# Patient Record
Sex: MALE | URBAN - METROPOLITAN AREA
[De-identification: names, ages, dates, MRNs, and addresses within clinical notes are randomized per-mention and may not be internally consistent; named-entity substitution may affect disease eponyms.]

---

## 2021-12-16 ENCOUNTER — APPOINTMENT (RX ONLY)
Dept: URBAN - METROPOLITAN AREA CLINIC 81 | Facility: CLINIC | Age: 51
Setting detail: DERMATOLOGY
End: 2021-12-16

## 2021-12-16 DIAGNOSIS — D18.0 HEMANGIOMA: ICD-10-CM

## 2021-12-16 DIAGNOSIS — L21.8 OTHER SEBORRHEIC DERMATITIS: ICD-10-CM

## 2021-12-16 PROBLEM — D18.01 HEMANGIOMA OF SKIN AND SUBCUTANEOUS TISSUE: Status: ACTIVE | Noted: 2021-12-16

## 2021-12-16 PROCEDURE — ? COUNSELING

## 2021-12-16 PROCEDURE — ? DIAGNOSIS COMMENT

## 2021-12-16 PROCEDURE — 99203 OFFICE O/P NEW LOW 30 MIN: CPT

## 2021-12-16 PROCEDURE — ? KOH PREP

## 2021-12-16 PROCEDURE — ? PRESCRIPTION

## 2021-12-16 PROCEDURE — ? PRESCRIPTION MEDICATION MANAGEMENT

## 2021-12-16 RX ORDER — KETOCONAZOLE 20 MG/G
CREAM TOPICAL
Qty: 30 | Refills: 2 | Status: ERX

## 2021-12-16 RX ORDER — FLUCONAZOLE 200 MG/1
TABLET ORAL
Qty: 6 | Refills: 0 | Status: ERX

## 2021-12-16 ASSESSMENT — LOCATION DETAILED DESCRIPTION DERM
LOCATION DETAILED: PERIUMBILICAL SKIN
LOCATION DETAILED: LEFT CLAVICULAR NECK
LOCATION DETAILED: INFERIOR MID FOREHEAD
LOCATION DETAILED: SUPERIOR MID FOREHEAD
LOCATION DETAILED: LEFT CLAVICULAR SKIN

## 2021-12-16 ASSESSMENT — LOCATION SIMPLE DESCRIPTION DERM
LOCATION SIMPLE: INFERIOR FOREHEAD
LOCATION SIMPLE: LEFT CLAVICULAR SKIN
LOCATION SIMPLE: ABDOMEN
LOCATION SIMPLE: LEFT ANTERIOR NECK
LOCATION SIMPLE: SUPERIOR FOREHEAD

## 2021-12-16 ASSESSMENT — LOCATION ZONE DERM
LOCATION ZONE: TRUNK
LOCATION ZONE: FACE
LOCATION ZONE: TRUNK
LOCATION ZONE: NECK

## 2021-12-16 ASSESSMENT — SEVERITY ASSESSMENT: HOW SEVERE IS THIS PATIENT'S CONDITION?: MODERATE

## 2021-12-16 NOTE — PROCEDURE: DIAGNOSIS COMMENT
Render Risk Assessment In Note?: no
Comment: I do not think that this is a food allergy
Detail Level: Simple

## 2021-12-16 NOTE — PROCEDURE: PRESCRIPTION MEDICATION MANAGEMENT
Initiate Treatment: Ketoconazole cream - apply daily as needed. \\nFluconazole 200 mg - take 2 pills in one dose once weekly for 3 weeks.
Detail Level: Zone
Plan: Gentle cleansers and moisturizers.\\nDiscussed dietary triggers.
Render In Strict Bullet Format?: No

## 2021-12-16 NOTE — HPI: RASH
What Type Of Note Output Would You Prefer (Optional)?: Bullet Format
How Severe Is Your Rash?: moderate
Is This A New Presentation, Or A Follow-Up?: Rash
Additional History: Pt has rash that comes and goes. Will occur after eating certain food (carbs, sugar, milk chocolate, dairy). Notices immediate reaction that last for a couple of days.

## 2022-08-05 ENCOUNTER — APPOINTMENT (RX ONLY)
Dept: URBAN - METROPOLITAN AREA CLINIC 81 | Facility: CLINIC | Age: 52
Setting detail: DERMATOLOGY
End: 2022-08-05

## 2022-08-05 DIAGNOSIS — B35.4 TINEA CORPORIS: ICD-10-CM

## 2022-08-05 PROCEDURE — ? PRESCRIPTION

## 2022-08-05 PROCEDURE — ? ORDER TESTS

## 2022-08-05 PROCEDURE — 99213 OFFICE O/P EST LOW 20 MIN: CPT

## 2022-08-05 PROCEDURE — ? KOH PREP

## 2022-08-05 PROCEDURE — ? DIAGNOSIS COMMENT

## 2022-08-05 PROCEDURE — ? PRESCRIPTION MEDICATION MANAGEMENT

## 2022-08-05 PROCEDURE — ? COUNSELING

## 2022-08-05 RX ORDER — KETOCONAZOLE 20 MG/G
CREAM TOPICAL BID
Qty: 60 | Refills: 5 | Status: ERX

## 2022-08-05 ASSESSMENT — LOCATION DETAILED DESCRIPTION DERM
LOCATION DETAILED: RIGHT LATERAL ABDOMEN
LOCATION DETAILED: RIGHT LATERAL ABDOMEN

## 2022-08-05 ASSESSMENT — LOCATION SIMPLE DESCRIPTION DERM
LOCATION SIMPLE: ABDOMEN
LOCATION SIMPLE: ABDOMEN

## 2022-08-05 ASSESSMENT — LOCATION ZONE DERM
LOCATION ZONE: TRUNK
LOCATION ZONE: TRUNK

## 2022-08-05 NOTE — PROCEDURE: DIAGNOSIS COMMENT
Render Risk Assessment In Note?: no
Comment: NOVA is indeterminate due to topical product on the skin.\\nDDX is infectious eczematoid dermatitis
Detail Level: Simple

## 2022-08-05 NOTE — HPI: RASH
What Type Of Note Output Would You Prefer (Optional)?: Bullet Format
How Severe Is Your Rash?: moderate
Is This A New Presentation, Or A Follow-Up?: Rash
Additional History: Patient is coming in for a rash that appeared about two to three months ago on the right side of his trunk. States that the right side is the only place where it has appeared. Patient states there is moderate discomfort which includes flaking, itching and burning. Patient also says that any type of rubbing on it does make it worse. Patient states that he did see an [facility] specialist and they gave him a cream to apple to affected area. Patient states that it soothed the burning and itching but did not resolve the issue.

## 2022-08-05 NOTE — PROCEDURE: ORDER TESTS
Bill For Surgical Tray: no
Billing Type: Client Bill
Clinical Notes (To The Lab): Right lateral abdomen superior - bacterial culture
Expected Date Of Service: 08/05/2022
Performing Laboratory: 0
Clinical Notes (To The Lab): Right lateral abdomen inferior - fungal culture
Billing Type: Client Bill
negative...

## 2022-08-05 NOTE — PROCEDURE: PRESCRIPTION MEDICATION MANAGEMENT
Initiate Treatment: Ketoconazole cream two times a day for three days
Detail Level: Zone
Render In Strict Bullet Format?: No

## 2022-08-31 ENCOUNTER — RX ONLY (OUTPATIENT)
Age: 52
Setting detail: RX ONLY
End: 2022-08-31

## 2022-08-31 RX ORDER — BETAMETHASONE DIPROPIONATE 0.5 MG/G
CREAM TOPICAL BID
Qty: 45 | Refills: 1 | Status: ERX

## 2022-08-31 RX ORDER — MUPIROCIN 20 MG/G
OINTMENT TOPICAL BID
Qty: 22 | Refills: 1 | Status: ERX

## 2022-09-01 ENCOUNTER — APPOINTMENT (RX ONLY)
Dept: URBAN - METROPOLITAN AREA CLINIC 81 | Facility: CLINIC | Age: 52
Setting detail: DERMATOLOGY
End: 2022-09-01

## 2022-09-01 DIAGNOSIS — L30.3 INFECTIVE DERMATITIS: ICD-10-CM

## 2022-09-01 PROCEDURE — 99213 OFFICE O/P EST LOW 20 MIN: CPT

## 2022-09-01 PROCEDURE — ? COUNSELING

## 2022-09-01 PROCEDURE — ? PRESCRIPTION MEDICATION MANAGEMENT

## 2022-09-01 PROCEDURE — ? PRESCRIPTION

## 2022-09-01 RX ORDER — DOXYCYCLINE 100 MG/1
TABLET, FILM COATED ORAL
Qty: 28 | Refills: 0 | Status: ERX

## 2022-09-01 ASSESSMENT — LOCATION ZONE DERM: LOCATION ZONE: TRUNK

## 2022-09-01 ASSESSMENT — LOCATION SIMPLE DESCRIPTION DERM: LOCATION SIMPLE: ABDOMEN

## 2022-09-01 ASSESSMENT — LOCATION DETAILED DESCRIPTION DERM: LOCATION DETAILED: RIGHT LATERAL ABDOMEN

## 2022-09-01 NOTE — PROCEDURE: PRESCRIPTION MEDICATION MANAGEMENT
Initiate Treatment: Betamehasone - mix with mupirocin and apply to affected area twice daily (has RX).\\nMupirocin - mix with betamethasone and apply to affected area twice daily (has RX).\\nDoxy 100mg - take 1 pill by mouth twice daily with food and water.
Render In Strict Bullet Format?: No
Detail Level: Zone
Plan: Discussed gentle skin care.\\nMoisturize (recommend Eucerin eczema relief).

## 2022-10-10 NOTE — PROCEDURE: COUNSELING
Detail Level: Detailed Colchicine Pregnancy And Lactation Text: This medication is Pregnancy Category C and isn't considered safe during pregnancy. It is excreted in breast milk.

## 2023-01-19 ENCOUNTER — APPOINTMENT (RX ONLY)
Dept: URBAN - METROPOLITAN AREA CLINIC 81 | Facility: CLINIC | Age: 53
Setting detail: DERMATOLOGY
End: 2023-01-19

## 2023-01-19 DIAGNOSIS — L30.0 NUMMULAR DERMATITIS: ICD-10-CM | Status: WORSENING

## 2023-01-19 DIAGNOSIS — L81.4 OTHER MELANIN HYPERPIGMENTATION: ICD-10-CM

## 2023-01-19 PROCEDURE — ? COUNSELING

## 2023-01-19 PROCEDURE — 99214 OFFICE O/P EST MOD 30 MIN: CPT

## 2023-01-19 PROCEDURE — ? PRESCRIPTION MEDICATION MANAGEMENT

## 2023-01-19 PROCEDURE — ? TREATMENT REGIMEN

## 2023-01-19 PROCEDURE — ? DIAGNOSIS COMMENT

## 2023-01-19 PROCEDURE — ? ORDER TESTS

## 2023-01-19 ASSESSMENT — LOCATION DETAILED DESCRIPTION DERM
LOCATION DETAILED: RIGHT ANTERIOR LATERAL DISTAL THIGH
LOCATION DETAILED: RIGHT ANTERIOR PROXIMAL THIGH
LOCATION DETAILED: RIGHT ANTERIOR LATERAL PROXIMAL THIGH
LOCATION DETAILED: LEFT ANTERIOR LATERAL DISTAL THIGH
LOCATION DETAILED: INFERIOR LUMBAR SPINE
LOCATION DETAILED: RIGHT LATERAL ABDOMEN

## 2023-01-19 ASSESSMENT — LOCATION ZONE DERM
LOCATION ZONE: TRUNK
LOCATION ZONE: LEG

## 2023-01-19 ASSESSMENT — LOCATION SIMPLE DESCRIPTION DERM
LOCATION SIMPLE: RIGHT THIGH
LOCATION SIMPLE: LEFT THIGH
LOCATION SIMPLE: ABDOMEN
LOCATION SIMPLE: LOWER BACK

## 2023-01-19 NOTE — PROCEDURE: ORDER TESTS
Bill For Surgical Tray: no
Lab Facility: [id number]
Billing Type: Client Bill
Performing Laboratory: [id number]
Expected Date Of Service: 01/19/2023

## 2023-01-19 NOTE — PROCEDURE: PRESCRIPTION MEDICATION MANAGEMENT
Render In Strict Bullet Format?: No
Plan: Betamethasone diproprionate cream 0.05% mix with mupirocin ointment  --apply BID for up to 2 weeks, wait 7 days until re-using
Detail Level: Zone

## 2023-01-19 NOTE — PROCEDURE: DIAGNOSIS COMMENT
Detail Level: Simple
Comment: consider hydroquinone rx if this persists
Render Risk Assessment In Note?: no

## 2023-01-19 NOTE — PROCEDURE: TREATMENT REGIMEN
Initiate Treatment: L-histidine - 1 gm  take four pills a day  Two in the morning and two at night time
Detail Level: Zone

## 2023-01-25 ENCOUNTER — RX ONLY (OUTPATIENT)
Age: 53
Setting detail: RX ONLY
End: 2023-01-25

## 2023-01-25 RX ORDER — DOXYCYCLINE 100 MG/1
TABLET, FILM COATED ORAL
Qty: 14 | Refills: 0 | Status: ERX

## 2023-02-13 ENCOUNTER — APPOINTMENT (RX ONLY)
Dept: URBAN - METROPOLITAN AREA CLINIC 86 | Facility: CLINIC | Age: 53
Setting detail: DERMATOLOGY
End: 2023-02-13

## 2023-02-13 VITALS — WEIGHT: 160 LBS | HEIGHT: 68 IN

## 2023-02-13 DIAGNOSIS — L08.0 PYODERMA: ICD-10-CM | Status: INADEQUATELY CONTROLLED

## 2023-02-13 DIAGNOSIS — L01.00 IMPETIGO, UNSPECIFIED: ICD-10-CM

## 2023-02-13 PROCEDURE — ? COUNSELING

## 2023-02-13 PROCEDURE — ? PRESCRIPTION MEDICATION MANAGEMENT

## 2023-02-13 PROCEDURE — ? PRESCRIPTION

## 2023-02-13 PROCEDURE — 99213 OFFICE O/P EST LOW 20 MIN: CPT

## 2023-02-13 RX ORDER — MUPIROCIN 20 MG/G
ONE APPLICATION OINTMENT TOPICAL BID
Qty: 22 | Refills: 0 | Status: ERX

## 2023-02-13 RX ORDER — BETAMETHASONE DIPROPIONATE 0.5 MG/G
ONE APPLICATION OINTMENT TOPICAL BID
Qty: 45 | Refills: 0 | Status: ERX

## 2023-02-13 ASSESSMENT — LOCATION SIMPLE DESCRIPTION DERM
LOCATION SIMPLE: LEFT THIGH
LOCATION SIMPLE: RIGHT THIGH
LOCATION SIMPLE: LEFT LOWER BACK
LOCATION SIMPLE: RIGHT NOSE
LOCATION SIMPLE: LEFT NOSE

## 2023-02-13 ASSESSMENT — LOCATION DETAILED DESCRIPTION DERM
LOCATION DETAILED: LEFT ANTERIOR PROXIMAL THIGH
LOCATION DETAILED: RIGHT NARIS
LOCATION DETAILED: LEFT INFERIOR MEDIAL MIDBACK
LOCATION DETAILED: RIGHT ANTERIOR DISTAL THIGH
LOCATION DETAILED: LEFT NARIS

## 2023-02-13 ASSESSMENT — LOCATION ZONE DERM
LOCATION ZONE: LEG
LOCATION ZONE: TRUNK
LOCATION ZONE: NOSE

## 2023-02-13 NOTE — PROCEDURE: MIPS QUALITY
Quality 226: Preventive Care And Screening: Tobacco Use: Screening And Cessation Intervention: Patient screened for tobacco use and is an ex/non-smoker
Detail Level: Detailed
Quality 110: Preventive Care And Screening: Influenza Immunization: Influenza immunization was not ordered or administered, reason not given
Quality 394b: Td/Tdap Immunizations For Adolescents: Patient did not have one Tdap or one Td vaccine on or between the patient's 10th and 13th birthdays.

## 2023-02-28 NOTE — PROCEDURE: PRESCRIPTION MEDICATION MANAGEMENT
Initiate Treatment: -betamethasone dipropionate 0.05 % topical ointment, Apply to affected areas bid x 2 weeks or less prn flares
Render In Strict Bullet Format?: No
Plan: -Recommend lukewarm showers and dove soap \\n-Recommend daily moisturizers after bath / shower \\n-F/u if not better
Detail Level: Zone
166

## 2024-06-21 ENCOUNTER — APPOINTMENT (RX ONLY)
Dept: URBAN - METROPOLITAN AREA CLINIC 86 | Facility: CLINIC | Age: 54
Setting detail: DERMATOLOGY
End: 2024-06-21

## 2024-06-21 VITALS — WEIGHT: 158 LBS | HEIGHT: 68 IN

## 2024-06-21 DIAGNOSIS — B07.0 PLANTAR WART: ICD-10-CM

## 2024-06-21 DIAGNOSIS — L20.89 OTHER ATOPIC DERMATITIS: ICD-10-CM | Status: IMPROVED

## 2024-06-21 DIAGNOSIS — L84 CORNS AND CALLOSITIES: ICD-10-CM

## 2024-06-21 DIAGNOSIS — L82.1 OTHER SEBORRHEIC KERATOSIS: ICD-10-CM

## 2024-06-21 PROCEDURE — ? PRESCRIPTION MEDICATION MANAGEMENT

## 2024-06-21 PROCEDURE — ? PHOTO-DOCUMENTATION

## 2024-06-21 PROCEDURE — ? SEPARATE AND IDENTIFIABLE DOCUMENTATION

## 2024-06-21 PROCEDURE — 17110 DESTRUCTION B9 LES UP TO 14: CPT

## 2024-06-21 PROCEDURE — ? LIQUID NITROGEN

## 2024-06-21 PROCEDURE — ? COUNSELING

## 2024-06-21 PROCEDURE — 99213 OFFICE O/P EST LOW 20 MIN: CPT | Mod: 25

## 2024-06-21 ASSESSMENT — LOCATION ZONE DERM
LOCATION ZONE: SCALP
LOCATION ZONE: FEET
LOCATION ZONE: TRUNK

## 2024-06-21 ASSESSMENT — LOCATION DETAILED DESCRIPTION DERM
LOCATION DETAILED: RIGHT SUPERIOR PARIETAL SCALP
LOCATION DETAILED: LEFT PLANTAR FOREFOOT OVERLYING 5TH METATARSAL
LOCATION DETAILED: PERIUMBILICAL SKIN
LOCATION DETAILED: LEFT PLANTAR FOREFOOT OVERLYING 1ST METATARSAL

## 2024-06-21 ASSESSMENT — LOCATION SIMPLE DESCRIPTION DERM
LOCATION SIMPLE: SCALP
LOCATION SIMPLE: LEFT PLANTAR SURFACE
LOCATION SIMPLE: ABDOMEN

## 2024-06-21 NOTE — PROCEDURE: PRESCRIPTION MEDICATION MANAGEMENT
Initiate Treatment: Betamethasone- apply bid x 2 weeks prn
Detail Level: Zone
Render In Strict Bullet Format?: No
Continue Regimen: Cerave
Initiate Treatment: Urea 40% cream apply qd to affected areas

## 2024-06-21 NOTE — PROCEDURE: LIQUID NITROGEN
Add 52 Modifier (Optional): no
Spray Paint Text: The liquid nitrogen was applied to the skin utilizing a spray paint frosting technique.
Consent: The patient's consent was obtained including but not limited to risks of crusting, scabbing, blistering, scarring, darker or lighter pigmentary change, recurrence, incomplete removal and infection.
Medical Necessity Information: It is in your best interest to select a reason for this procedure from the list below. All of these items fulfill various CMS LCD requirements except the new and changing color options.
Show Aperture Variable?: Yes
Application Tool (Optional): Liquid Nitrogen Sprayer
Number Of Freeze-Thaw Cycles: 2 freeze-thaw cycles
Medical Necessity Clause: This procedure was medically necessary because the lesions that were treated were:
Duration Of Freeze Thaw-Cycle (Seconds): 5-10
Detail Level: Detailed
Post-Care Instructions: I reviewed with the patient in detail post-care instructions. Patient is to wear sunprotection, and avoid picking at any of the treated lesions. Pt may apply Vaseline to crusted or scabbing areas.